# Patient Record
Sex: MALE | Race: WHITE | NOT HISPANIC OR LATINO
[De-identification: names, ages, dates, MRNs, and addresses within clinical notes are randomized per-mention and may not be internally consistent; named-entity substitution may affect disease eponyms.]

---

## 2017-01-10 ENCOUNTER — APPOINTMENT (OUTPATIENT)
Dept: INTERNAL MEDICINE | Facility: CLINIC | Age: 42
End: 2017-01-10

## 2017-01-10 VITALS
BODY MASS INDEX: 27.55 KG/M2 | DIASTOLIC BLOOD PRESSURE: 80 MMHG | HEART RATE: 74 BPM | OXYGEN SATURATION: 98 % | SYSTOLIC BLOOD PRESSURE: 110 MMHG | TEMPERATURE: 99 F | HEIGHT: 69 IN | RESPIRATION RATE: 16 BRPM | WEIGHT: 186 LBS

## 2017-01-10 DIAGNOSIS — Z83.3 FAMILY HISTORY OF DIABETES MELLITUS: ICD-10-CM

## 2017-01-10 DIAGNOSIS — Z83.49 FAMILY HISTORY OF OTHER ENDOCRINE, NUTRITIONAL AND METABOLIC DISEASES: ICD-10-CM

## 2017-01-10 DIAGNOSIS — R00.2 PALPITATIONS: ICD-10-CM

## 2017-01-10 DIAGNOSIS — Z87.898 PERSONAL HISTORY OF OTHER SPECIFIED CONDITIONS: ICD-10-CM

## 2017-01-10 DIAGNOSIS — Z87.891 PERSONAL HISTORY OF NICOTINE DEPENDENCE: ICD-10-CM

## 2017-01-10 DIAGNOSIS — Z87.19 PERSONAL HISTORY OF OTHER DISEASES OF THE DIGESTIVE SYSTEM: ICD-10-CM

## 2017-01-10 DIAGNOSIS — R13.10 DYSPHAGIA, UNSPECIFIED: ICD-10-CM

## 2017-03-05 PROBLEM — Z83.49 FAMILY HISTORY OF THYROID DISEASE: Status: ACTIVE | Noted: 2017-03-05

## 2017-03-05 PROBLEM — R00.2 PALPITATIONS: Status: RESOLVED | Noted: 2017-03-05 | Resolved: 2017-03-05

## 2017-03-05 PROBLEM — Z87.898 HISTORY OF VERTIGO: Status: RESOLVED | Noted: 2017-03-05 | Resolved: 2017-03-05

## 2017-03-05 PROBLEM — Z87.19 HISTORY OF ESOPHAGEAL SPASM: Status: RESOLVED | Noted: 2017-03-05 | Resolved: 2017-03-05

## 2017-03-05 PROBLEM — R13.10 DYSPHAGIA: Status: RESOLVED | Noted: 2017-03-05 | Resolved: 2017-03-05

## 2017-03-05 PROBLEM — Z83.3 FAMILY HISTORY OF DIABETES MELLITUS: Status: ACTIVE | Noted: 2017-03-05

## 2017-03-05 PROBLEM — Z87.891 FORMER SMOKER: Status: ACTIVE | Noted: 2017-03-05

## 2017-06-26 ENCOUNTER — MEDICATION RENEWAL (OUTPATIENT)
Age: 42
End: 2017-06-26

## 2017-10-17 ENCOUNTER — APPOINTMENT (OUTPATIENT)
Dept: INTERNAL MEDICINE | Facility: CLINIC | Age: 42
End: 2017-10-17
Payer: COMMERCIAL

## 2017-10-17 VITALS
HEART RATE: 80 BPM | RESPIRATION RATE: 12 BRPM | TEMPERATURE: 98.6 F | OXYGEN SATURATION: 95 % | HEIGHT: 69 IN | DIASTOLIC BLOOD PRESSURE: 81 MMHG | SYSTOLIC BLOOD PRESSURE: 121 MMHG | BODY MASS INDEX: 29.62 KG/M2 | WEIGHT: 200 LBS

## 2017-10-17 PROCEDURE — 99214 OFFICE O/P EST MOD 30 MIN: CPT

## 2017-10-20 LAB
25(OH)D3 SERPL-MCNC: 29.1 NG/ML
ALBUMIN SERPL ELPH-MCNC: 4.6 G/DL
ALP BLD-CCNC: 60 U/L
ALT SERPL-CCNC: 28 U/L
ANION GAP SERPL CALC-SCNC: 14 MMOL/L
APPEARANCE: CLEAR
AST SERPL-CCNC: 27 U/L
BASOPHILS # BLD AUTO: 0.02 K/UL
BASOPHILS NFR BLD AUTO: 0.2 %
BILIRUB SERPL-MCNC: 0.6 MG/DL
BILIRUBIN URINE: NEGATIVE
BLOOD URINE: NEGATIVE
BUN SERPL-MCNC: 15 MG/DL
CALCIUM SERPL-MCNC: 10.3 MG/DL
CHLORIDE SERPL-SCNC: 100 MMOL/L
CHOLEST SERPL-MCNC: 261 MG/DL
CHOLEST/HDLC SERPL: 4.4 RATIO
CO2 SERPL-SCNC: 25 MMOL/L
COLOR: YELLOW
CREAT SERPL-MCNC: 0.97 MG/DL
CREAT SPEC-SCNC: 39 MG/DL
EOSINOPHIL # BLD AUTO: 0.07 K/UL
EOSINOPHIL NFR BLD AUTO: 0.7 %
FOLATE SERPL-MCNC: 17.5 NG/ML
GLUCOSE QUALITATIVE U: NEGATIVE MG/DL
GLUCOSE SERPL-MCNC: 72 MG/DL
HBA1C MFR BLD HPLC: 5.1 %
HCT VFR BLD CALC: 47 %
HDLC SERPL-MCNC: 60 MG/DL
HGB BLD-MCNC: 16.4 G/DL
IMM GRANULOCYTES NFR BLD AUTO: 0.6 %
IRON SERPL-MCNC: 148 UG/DL
KETONES URINE: NEGATIVE
LDLC SERPL CALC-MCNC: 172 MG/DL
LEUKOCYTE ESTERASE URINE: NEGATIVE
LYMPHOCYTES # BLD AUTO: 2.63 K/UL
LYMPHOCYTES NFR BLD AUTO: 27.9 %
MAN DIFF?: NORMAL
MCHC RBC-ENTMCNC: 31.8 PG
MCHC RBC-ENTMCNC: 34.9 GM/DL
MCV RBC AUTO: 91.3 FL
MICROALBUMIN 24H UR DL<=1MG/L-MCNC: <0.3 MG/DL
MICROALBUMIN/CREAT 24H UR-RTO: NORMAL
MONOCYTES # BLD AUTO: 0.83 K/UL
MONOCYTES NFR BLD AUTO: 8.8 %
NEUTROPHILS # BLD AUTO: 5.81 K/UL
NEUTROPHILS NFR BLD AUTO: 61.8 %
NITRITE URINE: NEGATIVE
PH URINE: 6.5
PLATELET # BLD AUTO: 253 K/UL
POTASSIUM SERPL-SCNC: 4.4 MMOL/L
PROT SERPL-MCNC: 8.2 G/DL
PROTEIN URINE: NEGATIVE MG/DL
RBC # BLD: 5.15 M/UL
RBC # FLD: 12.2 %
SODIUM SERPL-SCNC: 139 MMOL/L
SPECIFIC GRAVITY URINE: 1.01
TRIGL SERPL-MCNC: 145 MG/DL
TSH SERPL-ACNC: 1.28 UIU/ML
UROBILINOGEN URINE: NEGATIVE MG/DL
VIT B12 SERPL-MCNC: 511 PG/ML
WBC # FLD AUTO: 9.42 K/UL

## 2018-01-18 ENCOUNTER — MEDICATION RENEWAL (OUTPATIENT)
Age: 43
End: 2018-01-18

## 2018-07-19 ENCOUNTER — APPOINTMENT (OUTPATIENT)
Dept: INTERNAL MEDICINE | Facility: CLINIC | Age: 43
End: 2018-07-19
Payer: COMMERCIAL

## 2018-07-19 VITALS
DIASTOLIC BLOOD PRESSURE: 84 MMHG | HEART RATE: 84 BPM | TEMPERATURE: 98.4 F | WEIGHT: 202 LBS | HEIGHT: 69 IN | RESPIRATION RATE: 12 BRPM | BODY MASS INDEX: 29.92 KG/M2 | OXYGEN SATURATION: 95 % | SYSTOLIC BLOOD PRESSURE: 122 MMHG

## 2018-07-19 DIAGNOSIS — E78.5 HYPERLIPIDEMIA, UNSPECIFIED: ICD-10-CM

## 2018-07-19 DIAGNOSIS — K21.9 GASTRO-ESOPHAGEAL REFLUX DISEASE W/OUT ESOPHAGITIS: ICD-10-CM

## 2018-07-19 DIAGNOSIS — E55.9 VITAMIN D DEFICIENCY, UNSPECIFIED: ICD-10-CM

## 2018-07-19 DIAGNOSIS — Z00.00 ENCOUNTER FOR GENERAL ADULT MEDICAL EXAMINATION W/OUT ABNORMAL FINDINGS: ICD-10-CM

## 2018-07-19 PROCEDURE — 99396 PREV VISIT EST AGE 40-64: CPT

## 2018-07-19 NOTE — ASSESSMENT
[FreeTextEntry1] : 43 yr old male w/hx of HLD, GERD, recent AGE, and PMHx of vertigo, palpitations, esophageal spasms, here for F/U of chronic issues, presents w/following:\par \par Health Maintenance = patient's exam is normal except as stated.  Labs to be collected today. \par \par HLD = due for labs.  Has enough meds for now.  Can RTO in 6 months for repeat labs.\par \par GERD = clinically stable.  No meds needed at this time.\par \par Vit D Def = Clinically stable.  No changes in tx at this time.  Labs ordered. \par \par Counseled patient for greater than 50% of this visit, including diagnoses information, medication usage and side effects, therapeutic goals and options, and followup. Patient's questions were answered. Patient expressed understanding of, and agreement with, assessment and plan.

## 2018-07-19 NOTE — PHYSICAL EXAM
[No Acute Distress] : no acute distress [Well Nourished] : well nourished [Well Developed] : well developed [Well-Appearing] : well-appearing [Normal Voice/Communication] : normal voice/communication [Normal Sclera/Conjunctiva] : normal sclera/conjunctiva [PERRL] : pupils equal round and reactive to light [EOMI] : extraocular movements intact [Normal Outer Ear/Nose] : the outer ears and nose were normal in appearance [Normal Oropharynx] : the oropharynx was normal [Normal TMs] : both tympanic membranes were normal [Normal Nasal Mucosa] : the nasal mucosa was normal [No JVD] : no jugular venous distention [Supple] : supple [No Lymphadenopathy] : no lymphadenopathy [Thyroid Normal, No Nodules] : the thyroid was normal and there were no nodules present [No Respiratory Distress] : no respiratory distress  [Clear to Auscultation] : lungs were clear to auscultation bilaterally [No Accessory Muscle Use] : no accessory muscle use [Normal Rate] : normal rate  [Regular Rhythm] : with a regular rhythm [Normal S1, S2] : normal S1 and S2 [No Murmur] : no murmur heard [No Carotid Bruits] : no carotid bruits [No Edema] : there was no peripheral edema [Soft] : abdomen soft [Non Tender] : non-tender [Non-distended] : non-distended [No Masses] : no abdominal mass palpated [No HSM] : no HSM [Normal Bowel Sounds] : normal bowel sounds [Normal Supraclavicular Nodes] : no supraclavicular lymphadenopathy [Normal Posterior Cervical Nodes] : no posterior cervical lymphadenopathy [Normal Anterior Cervical Nodes] : no anterior cervical lymphadenopathy [No CVA Tenderness] : no CVA  tenderness [No Spinal Tenderness] : no spinal tenderness [No Joint Swelling] : no joint swelling [Grossly Normal Strength/Tone] : grossly normal strength/tone [No Rash] : no rash [No Skin Lesions] : no skin lesions [Normal Gait] : normal gait [Coordination Grossly Intact] : coordination grossly intact [No Focal Deficits] : no focal deficits [Deep Tendon Reflexes (DTR)] : deep tendon reflexes were 2+ and symmetric [Speech Grossly Normal] : speech grossly normal [Memory Grossly Normal] : memory grossly normal [Normal Affect] : the affect was normal [Alert and Oriented x3] : oriented to person, place, and time [Normal Mood] : the mood was normal [Normal Insight/Judgement] : insight and judgment were intact

## 2018-07-19 NOTE — HISTORY OF PRESENT ILLNESS
[FreeTextEntry1] : CPE / annual exam [de-identified] : 43 yr old male w/hx of HLD, GERD, vertigo, vitamin D Deficiency here for yearly CPE.\par \par HLD = taking medications as prescribed. Denies any HA, LH, LOC, COV, palpitations, CP, SOB, TSANG, abdominal pain, edema, claudication, limb weakness or paresthesias.  Ran out of meds 2 months ago.  At a free health [facility], had a FS cholesterol done = 167 total cholesterol.  \par  \par Currently pt denies any N/V/D, weakness, malaise, F/C/NS, HA, LH, LOC, COV, palpitations, CP, SOB, TSANG, hemoptysis, abdominal pain, edema, claudication, limb weakness or paresthesias, polydipsia, polyphagia, polyuria, weight loss, hyperhidrosis, tremors, agitation, urinary F/E/D, melena, or BRBPR.

## 2018-07-20 LAB
25(OH)D3 SERPL-MCNC: 30.4 NG/ML
ALBUMIN SERPL ELPH-MCNC: 4.8 G/DL
ALP BLD-CCNC: 49 U/L
ALT SERPL-CCNC: 26 U/L
ANION GAP SERPL CALC-SCNC: 14 MMOL/L
APPEARANCE: CLEAR
AST SERPL-CCNC: 23 U/L
BILIRUB SERPL-MCNC: 0.5 MG/DL
BILIRUBIN URINE: NEGATIVE
BLOOD URINE: NEGATIVE
BUN SERPL-MCNC: 12 MG/DL
CALCIUM SERPL-MCNC: 9.6 MG/DL
CHLORIDE SERPL-SCNC: 102 MMOL/L
CHOLEST SERPL-MCNC: 245 MG/DL
CHOLEST/HDLC SERPL: 4.3 RATIO
CO2 SERPL-SCNC: 24 MMOL/L
COLOR: YELLOW
CREAT SERPL-MCNC: 0.87 MG/DL
ESTIMATED AVERAGE GLUCOSE: 100 MG/DL
FOLATE SERPL-MCNC: >20 NG/ML
GLUCOSE QUALITATIVE U: NEGATIVE MG/DL
GLUCOSE SERPL-MCNC: 79 MG/DL
HBA1C MFR BLD HPLC: 5.1 %
HDLC SERPL-MCNC: 57 MG/DL
IRON SERPL-MCNC: 96 UG/DL
KETONES URINE: NEGATIVE
LDLC SERPL CALC-MCNC: 169 MG/DL
LEUKOCYTE ESTERASE URINE: NEGATIVE
NITRITE URINE: NEGATIVE
PH URINE: 7
POTASSIUM SERPL-SCNC: 4.5 MMOL/L
PROT SERPL-MCNC: 7.8 G/DL
PROTEIN URINE: NEGATIVE MG/DL
SODIUM SERPL-SCNC: 140 MMOL/L
SPECIFIC GRAVITY URINE: 1.01
TRIGL SERPL-MCNC: 93 MG/DL
TSH SERPL-ACNC: 1.35 UIU/ML
UROBILINOGEN URINE: NEGATIVE MG/DL
VIT B12 SERPL-MCNC: 565 PG/ML

## 2018-08-08 LAB
BASOPHILS # BLD AUTO: 0.03 K/UL
BASOPHILS NFR BLD AUTO: 0.4 %
EOSINOPHIL # BLD AUTO: 0.1 K/UL
EOSINOPHIL NFR BLD AUTO: 1.2 %
HCT VFR BLD CALC: 48.4 %
HGB BLD-MCNC: 16.3 G/DL
IMM GRANULOCYTES NFR BLD AUTO: 0.4 %
LYMPHOCYTES # BLD AUTO: 2.37 K/UL
LYMPHOCYTES NFR BLD AUTO: 29.6 %
MAN DIFF?: NORMAL
MCHC RBC-ENTMCNC: 31.7 PG
MCHC RBC-ENTMCNC: 33.7 GM/DL
MCV RBC AUTO: 94 FL
MONOCYTES # BLD AUTO: 0.59 K/UL
MONOCYTES NFR BLD AUTO: 7.4 %
NEUTROPHILS # BLD AUTO: 4.89 K/UL
NEUTROPHILS NFR BLD AUTO: 61 %
PLATELET # BLD AUTO: 246 K/UL
RBC # BLD: 5.15 M/UL
RBC # FLD: 12.8 %
WBC # FLD AUTO: 8.01 K/UL

## 2018-09-04 ENCOUNTER — MEDICATION RENEWAL (OUTPATIENT)
Age: 43
End: 2018-09-04

## 2018-10-24 ENCOUNTER — RX RENEWAL (OUTPATIENT)
Age: 43
End: 2018-10-24

## 2020-02-06 ENCOUNTER — APPOINTMENT (OUTPATIENT)
Dept: ORTHOPEDIC SURGERY | Facility: CLINIC | Age: 45
End: 2020-02-06
Payer: COMMERCIAL

## 2020-02-06 PROCEDURE — 99204 OFFICE O/P NEW MOD 45 MIN: CPT

## 2020-02-06 PROCEDURE — 73030 X-RAY EXAM OF SHOULDER: CPT | Mod: LT

## 2020-02-06 RX ORDER — CELECOXIB 200 MG/1
200 CAPSULE ORAL TWICE DAILY
Qty: 60 | Refills: 2 | Status: ACTIVE | COMMUNITY
Start: 2020-02-06 | End: 1900-01-01

## 2020-02-06 NOTE — DISCUSSION/SUMMARY
[de-identified] : Patient's clinical exam as well as history are both consistent with rotator cuff tendinopathy. Patient defers a cortisone injection today we'll place him on Celebrex 200 mg twice a day for 6 day course. No improved patient will return in a week for possible cortisone injection. [Medication Risks Reviewed] : Medication risks reviewed

## 2020-02-06 NOTE — HISTORY OF PRESENT ILLNESS
[de-identified] : First visit for this gentleman right hand dominant with complaint of left shoulder pain for approximately 3 and half weeks. He recalls no specific accident or injury but finds it uncomfortable to sleep on it pain occasionally will wake him initially. Pain is well localized to the shoulder. Has difficulty with overhead activities and internal rotation activity such as putting on his shirt.

## 2020-02-06 NOTE — REASON FOR VISIT
[Initial Visit] : an initial visit for [FreeTextEntry2] : LEFT SHOULDER STIFFNESS AND PAIN-LIMITED ROM-SHARP PAINS - STARTED 4 WEEKS AGO

## 2020-02-06 NOTE — DISCUSSION/SUMMARY
[Medication Risks Reviewed] : Medication risks reviewed [de-identified] : Patient's clinical exam as well as history are both consistent with rotator cuff tendinopathy. Patient defers a cortisone injection today we'll place him on Celebrex 200 mg twice a day for 6 day course. No improved patient will return in a week for possible cortisone injection.

## 2020-02-06 NOTE — PHYSICAL EXAM
[de-identified] : Patient has left shoulder good cuff strength testing is resisted forward elevation and abduction. He has decreased internal rotation do to discomfort. He is neurovascularly intact distally positive impingement sign. [de-identified] : AP and lateral of the left shoulder obtained showing minimal a.c. arthritis and well-preserved glenohumeral articulation

## 2020-02-06 NOTE — PHYSICAL EXAM
[de-identified] : Patient has left shoulder good cuff strength testing is resisted forward elevation and abduction. He has decreased internal rotation do to discomfort. He is neurovascularly intact distally positive impingement sign. [de-identified] : AP and lateral of the left shoulder obtained showing minimal a.c. arthritis and well-preserved glenohumeral articulation

## 2020-02-06 NOTE — HISTORY OF PRESENT ILLNESS
[de-identified] : First visit for this gentleman right hand dominant with complaint of left shoulder pain for approximately 3 and half weeks. He recalls no specific accident or injury but finds it uncomfortable to sleep on it pain occasionally will wake him initially. Pain is well localized to the shoulder. Has difficulty with overhead activities and internal rotation activity such as putting on his shirt.

## 2020-03-10 ENCOUNTER — APPOINTMENT (OUTPATIENT)
Dept: ORTHOPEDIC SURGERY | Facility: CLINIC | Age: 45
End: 2020-03-10
Payer: COMMERCIAL

## 2020-03-10 PROCEDURE — 99214 OFFICE O/P EST MOD 30 MIN: CPT | Mod: 25

## 2020-03-10 PROCEDURE — 20611 DRAIN/INJ JOINT/BURSA W/US: CPT | Mod: LT

## 2020-03-10 NOTE — PHYSICAL EXAM
[de-identified] : Patient has left shoulder good cuff strength testing is resisted forward elevation and abduction. He has decreased internal rotation do to discomfort. He is neurovascularly intact distally positive impingement sign.

## 2020-03-10 NOTE — DISCUSSION/SUMMARY
[de-identified] : We advised the patient to return to the use of Celebrex twice a day for another 5-6 days. He will return for reevaluation if not thoroughly improved in 10-14 days. At that point a second injection may be considered and/or an MRI of the shoulder.

## 2020-03-10 NOTE — PROCEDURE
[de-identified] : Patient was given a sterile subacromial injection into the left shoulder. This was done via posterior approach. Patient tolerated the procedure well and felt almost immediate improvement

## 2020-03-10 NOTE — HISTORY OF PRESENT ILLNESS
[de-identified] : Patient returns today 6 weeks after his initial visit. Patient felt improved with the shoulder pain while taking Celebrex for 6 days but then the pain resumed and actually seems to be worse now. Patient has difficulty with overhead activities and internal rotation such as putting on a jacket.

## 2020-06-23 ENCOUNTER — APPOINTMENT (OUTPATIENT)
Dept: ORTHOPEDIC SURGERY | Facility: CLINIC | Age: 45
End: 2020-06-23
Payer: COMMERCIAL

## 2020-06-23 PROCEDURE — 99214 OFFICE O/P EST MOD 30 MIN: CPT

## 2020-06-23 RX ORDER — ACETAMINOPHEN AND CODEINE PHOSPHATE 300; 30 MG/1; MG/1
300-30 TABLET ORAL 3 TIMES DAILY
Qty: 40 | Refills: 0 | Status: ACTIVE | COMMUNITY
Start: 2020-06-23 | End: 1900-01-01

## 2020-06-23 NOTE — HISTORY OF PRESENT ILLNESS
[de-identified] : Patient returns today he slightly improved with his left shoulder pain. Recent MRI indicates that he actually has not a rotator cuff tear but rather adhesive capsulitis. He has had this once before in his life. He is difficulty with overhead activities and internal rotation is now comfortable sleeping at night.

## 2020-06-23 NOTE — PHYSICAL EXAM
[de-identified] : Patient has left shoulder good cuff strength testing is resisted forward elevation and abduction. He has decreased internal rotation do to discomfort. He is neurovascularly intact distally positive impingement sign.

## 2020-06-23 NOTE — DISCUSSION/SUMMARY
[de-identified] : Patient was told that physical therapy is usually all as required. He'll be sent twice a week for 5 weeks. He is also given a Tylenol No. 3 prescription to take only on days of physical therapy and the occasional painful night. His only given 50 of these tablets he'll follow up with me after 5-6 weeks if not improved. [Medication Risks Reviewed] : Medication risks reviewed

## 2020-12-09 ENCOUNTER — TRANSCRIPTION ENCOUNTER (OUTPATIENT)
Age: 45
End: 2020-12-09